# Patient Record
(demographics unavailable — no encounter records)

---

## 2025-07-28 NOTE — DISCUSSION/SUMMARY
[de-identified] : Chief complaint Back pain  Patient is a 39-year-old Who Sustained a Mechanical Fall in June 2024 B Fell from 10 Feet While He Was Cutting a Tree.  He Landed on His Back.  He Is Currently Not Working.  He Works for the [employer] at [employer].  He Does Not Have Any Pain Shooting down His Legs.  Extension Bothers Him.  No Fevers or Chills No Issues with Gait or Balance or Bowel Bladder Incontinence  Patient is NAD, AAOX3 skin is intact, NTTP in LS SPINE mild pain with ROM 5/5 motor strength in BLE SILT L1-S1 BLE POSITIVE straight leg raise Negative vicky equal patella/achilles reflex normal gait  I reviewed x-rays lumbar spine which demonstrates L2 superior endplate fracture, L4-5 mild spinal listhesis.  I discussed at length with the patient nature the condition.  Patient presents with back pain secondary to a L2 age-indeterminate compression fracture.  We will get an MRI to better evaluate this.  Will hold off on any PT until he determine the healing rate of this.  We discussed that these fractures can take up to 8 to 12 weeks to heal.  He currently is not working.  All the patient's questions were answered.

## 2025-07-30 NOTE — DISCUSSION/SUMMARY
[de-identified] : At this point I think the patient would benefit from structured physical therapy. A prescription was given to them today. I also recommended a wrist splint as well as a Flex Bar. My hope is that their symptoms will improve over the next 2 months. I will plan to see them back in the office in 2 months time for repeat evaluation. If they are still symptomatic I would recommend an MRI of the elbow to further evaluate the status of the ECRB vs. a PRP injection. All of their questions were answered. They agree with the treatment plan

## 2025-07-30 NOTE — HISTORY OF PRESENT ILLNESS
[de-identified] : Very pleasant 39-year-old gentleman comes in with right elbow discomfort.  This been going on since he fell about a month ago on 6/14/2025 at work.  He injured his back as well as his right elbow.  Currently main complaint is pain and discomfort on the lateral aspect of his right elbow.

## 2025-07-30 NOTE — PHYSICAL EXAM
[de-identified] : Well appearing person in no acute distress. Appears their stated age. Alert and oriented x3. Normal mood and affect. No significant swelling, bruising or erythema on exam. They have good range of motion of their elbow today. They have tenderness over the extensor carpi radialis brevis. No medial tenderness. Negative Tinels at the elbow. They have pain with resisted forearm supination and wrist extension. No pain with range of motion of their neck or shoulder. Neurovascularly intact in their upper extremity. [de-identified] : 3 views right elbow ordered and reviewed.  These demonstrate no acute osseous abnormalities.  Joint spaces well-maintained